# Patient Record
Sex: FEMALE | Race: OTHER | Employment: UNEMPLOYED | URBAN - METROPOLITAN AREA
[De-identification: names, ages, dates, MRNs, and addresses within clinical notes are randomized per-mention and may not be internally consistent; named-entity substitution may affect disease eponyms.]

---

## 2021-01-13 ENCOUNTER — HOSPITAL ENCOUNTER (OUTPATIENT)
Dept: PHYSICAL THERAPY | Age: 66
Discharge: HOME | End: 2021-01-13
Attending: STUDENT IN AN ORGANIZED HEALTH CARE EDUCATION/TRAINING PROGRAM
Payer: MEDICARE

## 2021-01-13 DIAGNOSIS — R49.0 MUSCLE TENSION DYSPHONIA: ICD-10-CM

## 2021-01-13 PROCEDURE — 92524 BEHAVRAL QUALIT ANALYS VOICE: CPT | Performed by: SPEECH-LANGUAGE PATHOLOGIST

## 2021-01-13 NOTE — THERAPY EVALUATION
Jocelin Bernal : 1955 Primary: [employer]* Secondary:  [facility] [place] [id number] [facility], [address], [facility] [address] [phone]   [phone] OUTPATIENT SPEECH LANGUAGE PATHOLOGY: Initial Assessment ICD-10: Treatment Diagnosis: Dysphonia R49.0 REFERRING PHYSICIAN: Zehra Mary MD MD Orders: Evaluate and Treat Return Physician Appointment: Unknown PAST MEDICAL HISTORY:  
Ms. Rolo Bernal is a 77 y.o. female who  has no past medical history on file. She also  has no past surgical history on file. 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 
Patient is a 77year old female referred for speech evaluation due to hoarseness. Patient reports this has been an ongoing issue for about 5-6 years. She was referred to ENT for further work up. Laryngoscopy revealed the following: False vocal cord use during phonation consistent with muscle tension dysphonia. There was some edema and erythema of the inter-arytenoid region c/w reflux, but no concerning mucosal lesions. The posterior pharyngeal was clear as well. The scope was then carefully removed. The patient tolerated the procedure well and there were no complications. Some question of spasmodic dysphonia as well. 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 Voice evaluation completed. Currently she drinks about 57 ounces of water daily and minimal caffeine. She does not smoke or drink alcohol. Her vocal activities consist of her singing to her grandson. She does not talk a lot on the phone but does yell/shout frequently. She endorses some coughing and throat clearing as well. (+) changes noted in her voice with changes in temperature only. She does have a history of GERD in which she currently takes Prilosec twice a day. Of note, she reports that prior to the reflux medication, she did not taste acid but now she does. She also endorses lower quadrant stomach pain. She also notices that she burps more as well. She is trying to avoid spicy foods and eat smaller meals as well. We discussed best practice and recommendations for GERD. She and spouse verbalized understanding. Oral motor exam completed. She has natural dentition with fair oral hygiene. She c/o sore throat. She has moderate pharyngeal wall redness in addition to ulcer along base of tongue. She may would benefit from magic mouthwash. Will discuss with referring MD. Laryngeal performance revealed the following: /s/ ration 10 seconds, /z/ ratio 2 seconds. Reduced laryngeal function as it relates to respiration observed. Maximum phonation 4 seconds with reduced and unstable pitch, tone and loudness. Maximum phonation should be 15 seconds for normed adults. Pitch breaks with reduced range and neck tension during pitch glides and scales. Clinical exam is consistent with muscle tensions dysphonia but spasmodic dysphonia should be considered as well. Treatment will focus on breath support, forward focus, decreasing laryngeal tension and strain, GERD and vocal hygiene strategies. Voice therapy recommended at 2x's a week for 8 weeks then I will re-assess. Education handouts given for GERD and vocal hygiene strategies. ?????? ? ? This section established at most recent assessment?????????? 
PROBLEM LIST (Impairments causing functional limitations): 1. Muscle tension dysphonia GOALS: (Goals have been discussed and agreed upon with patient.) SHORT-TERM FUNCTIONAL GOALS: Time Frame: 8 weeks 1. Demonstrate implementation of vocal hygiene techniques including improved hydration, reduced habitual coughing, throat clearing, and adherence to reflux precautions and medications regiment, reduction of vocally abusive behaviors with 90% adherence on a daily basis to reduce laryngeal inflammation. 2. Complete laryngeal massage, stretching, relaxation exercises on a daily basis with 90% accuracy to reduce hyper-function and laryngeal tension, reduce pain in laryngeal area and reduce irritation and globus in laryngeal area. 3. Use diaphragmatic breath support in structured tasks to sentence level with 80% accuracy to provide power to voice without vocal strain or laryngeal tension. 4. Use forward focus of voice energy with 80% accuracy in structured conversation to allow natural resonance of pitch without vocal strain or laryngeal tension. 5. Achieve maximum phonation time of 15 sec to improve breath support at phrase and sentence level in conversation. DISCHARGE GOALS: Time Frame: 2 months 1. Patient will improve vocal quality with use of forward focus strategies/techniques to improve vocal strength. REHABILITATION POTENTIAL FOR STATED GOALS: GoodPLAN OF CARE: 
Patient will benefit from skilled intervention to address the following impairments. INTERVENTIONS PLANNED: (Benefits and precautions of therapy have been discussed with the patient.) COMMUNICATION STRATEGIES:vocal rest  
VOCAL HYGIENE RECOMMENDATIONS: 
· Increase water intake · Limit caffeinated beverages · Voice rest 
· Decrease/Eliminate throat clearing · Follow reflux guidelines TREATMENT PLAN EFFECTIVE DATES: 1/13/2021 TO 3/14/2021 (60 days). FREQUENCY/DURATION: Continue to follow patient 2 times a week for 60 days to address above goals. Regarding Jocelin Machucaro's therapy, I certify that the treatment plan above will be carried out by a therapist or under their direction. Thank you for this referral, 
DARWIN Thompson Referring Physician Signature: Minh Mendez MD     Date SUBJECTIVE: 
Alert and pleasant Present Symptoms: Dysphonia Current Medications:  
Current Outpatient Medications on File Prior to Encounter Medication Sig Dispense Refill  omeprazole (PRILOSEC) 20 mg capsule Take 20 mg by mouth two (2) times a day. No current facility-administered medications on file prior to encounter. Date Last Reviewed: 1/13/2021 History of reflux:  YES    
? Reflux medication:Prilosec Social History/Home Situation:  Work/Activity History: Not employed OBJECTIVE: 
Objective Measure: Tool Used: National Outcomes Measurement System: Functional Communication Measures: VOICE Score:  Initial: 3 Most Recent: X (Date: -- ) Interpretation of Tool: This FCM should not be used for individuals who have had a laryngectomy or tracheotomy, or for individuals with resonance disorders. o Level 1:  The individual is unable to use voice to communicate. Alternative means for communicating are used all of the time. The individual cannot participate in vocational, avocational, and social activities requiring voice. o Level 2:  Voice is not functional for communication most of the time. Alternative means for communicating must be used most of the time. The individuals participation in vocational, avocational, and social activities is significantly limited all of the time. o Level 3:  Voice is functional for communication, but is consistently distracting and interferes with communication by drawing attention to itself. Participation in vocational, avocational, and social activities is limited most of the time. o Level 4:  Voice is functional for communication, but sometimes distracting. The individuals ability to participate in vocational, avocational, and social activities requiring voice is occasionally affected in low-vocal demand activities, but consistently affected in high-vocal demand activities. o Level 5:  Voice occasionally sounds normal with self-monitoring, but there is some situational variation. The individuals ability to participate in vocational, avocational, and social activities requiring voice is rarely affected in low-vocal demand activities, but is occasionally affected in high-vocal demand activities. o Level 6:  Voice sounds normal most of the time across all settings and situations. Self-monitoring is consistent when needed. The individuals ability to participate in vocational, avocational, and social activities requiring voice is not affected in low vocal demand activities, but is rarely affected in high-vocal demand activities. o Level 7: The individuals ability to successfully and independently participate in vocational, avocational, and social activities requiring high-or low-vocal demands is not limited by voice. Self-monitoring is effectively used, but only occasionally needed. Respiratory Status:  Room Air Oral Motor Structure/Speech:    
Voice: Voice Evaluation Type of Assessment: Perceptual eval/non-instrumental, Rating scales (specify) Vocal Onset: Breathy, S/Z ratio (comment) Vocal Quality: Back tone focus, Breathy, Spasmodic dysphonia, Hoarse, Phonation breaks Vocal Intensity: Too soft Vocal Pitch: Impaired flexibility, Pitch breaks, Restricted range Resonance: No impairment Breath Support: Clavicular breathing Vocal Cord Dysfunction: No impairment Vocal Cord Dysfunction: No 
Intonation: WNL Rate: WNL Prosody: WNL Overall Voice Impairment Severity: Moderate S/Z Ratio: /s/ 10 seconds   /z/ 2 seconds Maximum Phonation Time: 4 seconds Voice Handicap Index: not completed GRBAS:  
Grade (overall degree of hoarseness): 3 Rough (impression of the irregularity of the vocal fold vibrations): 1 Breathy (impression of air loss through the glottis):4 Aesthenic (weakness or lack of power in the voice): 1 Strained (perceptual impression of vocal hyperfunction): 2 
    TOTAL SCORE (Normal=0): 11 Vocally Abusive Behavior:  
· Excessive throat clearing · Decrease water intake · Frequent yelling or shouting Assessment only; No treatment(s) provided today__________________________________________________________________________________________________ Treatment Assessment:  Evaluation completed Progression/Medical Necessity:  
Patient is expected to demonstrate progress in vocal fold adduction, vocal quality and phonation/respiration coordination to improve functional communication, improve vocal endurance/decrease fatigue and decrease vocal tension/strain. Compliance with Program/Exercises: Will assess as treatment progresses Reason for Continuation of Services/Other Comments: 
· Patient continues to require skilled intervention due to muscle tension dysphonia . Recommendations/Intent for next treatment session: \"Treatment next visit will focus on goals\". Total Treatment Duration: 
Time In: 0930 Time Out: 1015 DARWIN Grijalva

## 2021-01-18 ENCOUNTER — HOSPITAL ENCOUNTER (OUTPATIENT)
Dept: PHYSICAL THERAPY | Age: 66
Discharge: HOME | End: 2021-01-18
Attending: STUDENT IN AN ORGANIZED HEALTH CARE EDUCATION/TRAINING PROGRAM
Payer: MEDICARE

## 2021-01-18 PROCEDURE — 92507 TX SP LANG VOICE COMM INDIV: CPT | Performed by: SPEECH-LANGUAGE PATHOLOGIST

## 2021-01-18 NOTE — PROGRESS NOTES
Jocelin Cullen  : 1955  Primary: [employer] [facility]*  Secondary:  [address] at [place] [id number] [facility], [address] [place] [address],[address], [address]  [phone]   [phone]         OUTPATIENT SPEECH LANGUAGE PATHOLOGY: Daily Note 1    ICD-10: Treatment Diagnosis: Dysphonia R49.0  REFERRING PHYSICIAN: Taylor Dick MD MD Orders: Evaluate and Treat  Return Physician Appointment: Unknown  PAST MEDICAL HISTORY:   Ms. Andre Cullen is a 77 y.o. female who  has no past medical history on file. She also  has no past surgical history on file. MEDICAL/REFERRING DIAGNOSIS: Dysphonia [R49.0]  DATE OF ONSET: 5-6 years ago  PRIOR LEVEL UOF FNCTION: Independent with ADL's  PRECAUTIONS/ALLERGIES: NKDA  ASSESSMENT:  Patient is a 77year old female referred for speech evaluation due to hoarseness. Patient reports this has been an ongoing issue for about 5-6 years. She was referred to ENT for further work up. Laryngoscopy revealed the following: False vocal cord use during phonation consistent with muscle tension dysphonia. There was some edema and erythema of the inter-arytenoid region c/w reflux, but no concerning mucosal lesions. The posterior pharyngeal was clear as well. The scope was then carefully removed. The patient tolerated the procedure well and there were no complications. Some question of spasmodic dysphonia as well. 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 Patient is present this date for this date. She endorses increased GERD along with stomach pain. She reports that she has stopped the Prilosec for about 2 days due to increased reflux sx's. She reports that she eats her last meal around 6pm and stays up and moving until about 8pm and once she sits down the reflux begins.  She reports coughing up frothy phlegm as well. We continue to discuss GERD management and vocal hygiene. Completed vocal tasks focusing on breathing utilizing breather. Inhale and Exhale set at 1. Increased difficulty with breathing using diaphragm. Noticeable tension along neck and shoulder region. Required mod A for proper use of technique. Completed forward focus with mod A due to technique. See below for specific tasks. ?????? ? ? This section established at most recent assessment??????????  PROBLEM LIST (Impairments causing functional limitations):  1. Muscle tension dysphonia   GOALS: (Goals have been discussed and agreed upon with patient.)  SHORT-TERM FUNCTIONAL GOALS: Time Frame: 8 weeks  1. Demonstrate implementation of vocal hygiene techniques including improved hydration, reduced habitual coughing, throat clearing, and adherence to reflux precautions and medications regiment, reduction of vocally abusive behaviors with 90% adherence on a daily basis to reduce laryngeal inflammation. 2. Complete laryngeal massage, stretching, relaxation exercises on a daily basis with 90% accuracy to reduce hyper-function and laryngeal tension, reduce pain in laryngeal area and reduce irritation and globus in laryngeal area. 3. Use diaphragmatic breath support in structured tasks to sentence level with 80% accuracy to provide power to voice without vocal strain or laryngeal tension. 4. Use forward focus of voice energy with 80% accuracy in structured conversation to allow natural resonance of pitch without vocal strain or laryngeal tension. 5. Achieve maximum phonation time of 15 sec to improve breath support at phrase and sentence level in conversation. DISCHARGE GOALS: Time Frame: 2 months   1. Patient will improve vocal quality with use of forward focus strategies/techniques to improve vocal strength.    REHABILITATION POTENTIAL FOR STATED GOALS: Wiley Jovel OF CARE:  Patient will benefit from skilled intervention to address the following impairments. INTERVENTIONS PLANNED: (Benefits and precautions of therapy have been discussed with the patient.)  COMMUNICATION STRATEGIES:vocal rest   VOCAL HYGIENE RECOMMENDATIONS:  · Increase water intake  · Limit caffeinated beverages  · Voice rest  · Decrease/Eliminate throat clearing  · Follow reflux guidelines  TREATMENT PLAN EFFECTIVE DATES: 1/13/2021 TO 3/14/2021 (60 days). FREQUENCY/DURATION: Continue to follow patient 2 times a week for 60 days to address above goals. Regarding Jocelin Jauregui's therapy, I certify that the treatment plan above will be carried out by a therapist or under their direction. Thank you for this referral,  DARWIN Whitaker Ed CCC-SLP                  Referring Physician Signature: Chey Rod MD     Date      SUBJECTIVE:  Alert and pleasant  Present Symptoms: Dysphonia      Current Medications:   Current Outpatient Medications on File Prior to Encounter   Medication Sig Dispense Refill    omeprazole (PRILOSEC) 20 mg capsule Take 20 mg by mouth two (2) times a day. No current facility-administered medications on file prior to encounter. Date Last Reviewed: 1/18/2021  History of reflux:  YES      Reflux medication:Prilosec  Social History/Home Situation:        Work/Activity History: Not employed     OBJECTIVE:  Objective Measure: Tool Used: National Outcomes Measurement System: Functional Communication Measures: VOICE  Score:  Initial: 3 Most Recent: X (Date: -- )   Interpretation of Tool: This FCM should not be used for individuals who have had a laryngectomy or tracheotomy, or for individuals with resonance disorders. o Level 1:  The individual is unable to use voice to communicate. Alternative means for communicating are used all of the time. The individual cannot participate in vocational, avocational, and social activities requiring voice. o Level 2:  Voice is not functional for communication most of the time.  Alternative means for communicating must be used most of the time. The individuals participation in vocational, avocational, and social activities is significantly limited all of the time. o Level 3:  Voice is functional for communication, but is consistently distracting and interferes with communication by drawing attention to itself. Participation in vocational, avocational, and social activities is limited most of the time. o Level 4:  Voice is functional for communication, but sometimes distracting. The individuals ability to participate in vocational, avocational, and social activities requiring voice is occasionally affected in low-vocal demand activities, but consistently affected in high-vocal demand activities. o Level 5:  Voice occasionally sounds normal with self-monitoring, but there is some situational variation. The individuals ability to participate in vocational, avocational, and social activities requiring voice is rarely affected in low-vocal demand activities, but is occasionally affected in high-vocal demand activities. o Level 6:  Voice sounds normal most of the time across all settings and situations. Self-monitoring is consistent when needed. The individuals ability to participate in vocational, avocational, and social activities requiring voice is not affected in low vocal demand activities, but is rarely affected in high-vocal demand activities. o Level 7: The individuals ability to successfully and independently participate in vocational, avocational, and social activities requiring high-or low-vocal demands is not limited by voice. Self-monitoring is effectively used, but only occasionally needed.     Respiratory Status:  Room Air  Oral Motor Structure/Speech:     Voice:    S/Z Ratio: /s/ 10 seconds   /z/ 2 seconds  Maximum Phonation Time: 4 seconds   Voice Handicap Index: not completed    GRBAS:   Grade (overall degree of hoarseness): 3  Rough (impression of the irregularity of the vocal fold vibrations): 1  Breathy (impression of air loss through the glottis):4  Aesthenic (weakness or lack of power in the voice): 1  Strained (perceptual impression of vocal hyperfunction): 2      TOTAL SCORE (Normal=0): 11  Vocally Abusive Behavior:   · Excessive throat clearing  · Decrease water intake  · Frequent yelling or shouting  Voice Activities: Activities/Procedures listed utilized to improve progress in vocal quality and phonation/respiration coordination. Required moderate cueing to improve functional communication, improve vocal endurance/decrease fatigue and decrease vocal tension/strain. 1.  Breather   Inhale 1: 5 repetitions  Exhale 1: 5 repetitions     2. Vocal warm up  -lip trills voiced and unvoiced   __________________________________________________________________________________________________  Treatment Assessment:  Evaluation completed  Progression/Medical Necessity:   Patient is expected to demonstrate progress in vocal fold adduction, vocal quality and phonation/respiration coordination to improve functional communication, improve vocal endurance/decrease fatigue and decrease vocal tension/strain. Compliance with Program/Exercises: Will assess as treatment progresses   Reason for Continuation of Services/Other Comments:  · Patient continues to require skilled intervention due to muscle tension dysphonia . Recommendations/Intent for next treatment session: \"Treatment next visit will focus on goals\". Total Treatment Duration:  Time In: 0845  Time Out: KAMILA [address], Aki Garrison. Teresa Sanchez

## 2021-01-20 ENCOUNTER — APPOINTMENT (OUTPATIENT)
Dept: PHYSICAL THERAPY | Age: 66
End: 2021-01-20
Attending: STUDENT IN AN ORGANIZED HEALTH CARE EDUCATION/TRAINING PROGRAM
Payer: MEDICARE

## 2021-01-27 ENCOUNTER — HOSPITAL ENCOUNTER (OUTPATIENT)
Dept: PHYSICAL THERAPY | Age: 66
Discharge: HOME | End: 2021-01-27
Attending: STUDENT IN AN ORGANIZED HEALTH CARE EDUCATION/TRAINING PROGRAM
Payer: MEDICARE

## 2021-01-27 DIAGNOSIS — R49.0 MUSCLE TENSION DYSPHONIA: ICD-10-CM

## 2021-01-27 PROCEDURE — 92507 TX SP LANG VOICE COMM INDIV: CPT | Performed by: SPEECH-LANGUAGE PATHOLOGIST

## 2021-01-27 NOTE — PROGRESS NOTES
Jocelin Kendall  : 1955  Primary: Ko Delay*  Secondary:  [address] at [address] [id number] [facility], [address] [place] [address] [address], [address] [facility]  [phone]   [phone]         OUTPATIENT SPEECH LANGUAGE PATHOLOGY: Daily Note 1    ICD-10: Treatment Diagnosis: Dysphonia R49.0  REFERRING PHYSICIAN: Stephy Soares MD MD Orders: Evaluate and Treat  Return Physician Appointment: Unknown  PAST MEDICAL HISTORY:   Ms. Mauricio Kendall is a 77 y.o. female who  has no past medical history on file. She also  has no past surgical history on file. 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 Patient reports this has been an ongoing issue for about 5-6 years. She was referred to ENT for further work up. Laryngoscopy revealed the following: False vocal cord use during phonation consistent with muscle tension dysphonia. There was some edema and erythema of the inter-arytenoid region c/w reflux, but no concerning mucosal lesions. The posterior pharyngeal was clear as well. The scope was then carefully removed. The patient tolerated the procedure well and there were no complications. Some question of spasmodic dysphonia as well. 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 Patient is present this date for this date. She endorses increased GERD along with stomach pain. She reports that she has stopped the Prilosec for about 2 days due to increased reflux sx's. She reports that she eats her last meal around 6pm and stays up and moving until about 8pm and once she sits down the reflux begins. She reports coughing up frothy phlegm as well. We continue to discuss GERD management and vocal hygiene. Completed vocal tasks focusing on breathing utilizing breather. Inhale and Exhale set at 1. Increased difficulty with breathing using diaphragm. Noticeable tension along neck and shoulder region. Required mod A for proper use of technique. Completed forward focus with mod A due to technique. See below for specific tasks. ?????? ? ? This section established at most recent assessment??????????  PROBLEM LIST (Impairments causing functional limitations):  1. Muscle tension dysphonia   GOALS: (Goals have been discussed and agreed upon with patient.)  SHORT-TERM FUNCTIONAL GOALS: Time Frame: 8 weeks  1. Demonstrate implementation of vocal hygiene techniques including improved hydration, reduced habitual coughing, throat clearing, and adherence to reflux precautions and medications regiment, reduction of vocally abusive behaviors with 90% adherence on a daily basis to reduce laryngeal inflammation. 2. Complete laryngeal massage, stretching, relaxation exercises on a daily basis with 90% accuracy to reduce hyper-function and laryngeal tension, reduce pain in laryngeal area and reduce irritation and globus in laryngeal area. 3. Use diaphragmatic breath support in structured tasks to sentence level with 80% accuracy to provide power to voice without vocal strain or laryngeal tension. 4. Use forward focus of voice energy with 80% accuracy in structured conversation to allow natural resonance of pitch without vocal strain or laryngeal tension. 5. Achieve maximum phonation time of 15 sec to improve breath support at phrase and sentence level in conversation. DISCHARGE GOALS: Time Frame: 2 months   1. Patient will improve vocal quality with use of forward focus strategies/techniques to improve vocal strength.    REHABILITATION POTENTIAL FOR STATED GOALS: Loletha Phalen OF CARE:  Patient will benefit from skilled intervention to address the following impairments. INTERVENTIONS PLANNED: (Benefits and precautions of therapy have been discussed with the patient.)  COMMUNICATION STRATEGIES:vocal rest   VOCAL HYGIENE RECOMMENDATIONS:  · Increase water intake  · Limit caffeinated beverages  · Voice rest  · Decrease/Eliminate throat clearing  · Follow reflux guidelines  TREATMENT PLAN EFFECTIVE DATES: 1/13/2021 TO 3/14/2021 (60 days). FREQUENCY/DURATION: Continue to follow patient 2 times a week for 60 days to address above goals. Regarding Jocelin Jauregui's therapy, I certify that the treatment plan above will be carried out by a therapist or under their direction. Thank you for this referral,  DARWIN Phelps Ed CCC-SLP                  Referring Physician Signature: Laura Seaman MD     Date      SUBJECTIVE:  Alert and pleasant  Present Symptoms: Dysphonia      Current Medications:   Current Outpatient Medications on File Prior to Encounter   Medication Sig Dispense Refill    omeprazole (PRILOSEC) 20 mg capsule Take 20 mg by mouth two (2) times a day. No current facility-administered medications on file prior to encounter. Date Last Reviewed: 1/18/2021  History of reflux:  YES      Reflux medication:Prilosec  Social History/Home Situation:        Work/Activity History: Not employed     OBJECTIVE:  Objective Measure: Tool Used: National Outcomes Measurement System: Functional Communication Measures: VOICE  Score:  Initial: 3 Most Recent: X (Date: -- )   Interpretation of Tool: This FCM should not be used for individuals who have had a laryngectomy or tracheotomy, or for individuals with resonance disorders. o Level 1:  The individual is unable to use voice to communicate. Alternative means for communicating are used all of the time. The individual cannot participate in vocational, avocational, and social activities requiring voice. o Level 2:  Voice is not functional for communication most of the time. Alternative means for communicating must be used most of the time. The individuals participation in vocational, avocational, and social activities is significantly limited all of the time. o Level 3:  Voice is functional for communication, but is consistently distracting and interferes with communication by drawing attention to itself. Participation in vocational, avocational, and social activities is limited most of the time. o Level 4:  Voice is functional for communication, but sometimes distracting. The individuals ability to participate in vocational, avocational, and social activities requiring voice is occasionally affected in low-vocal demand activities, but consistently affected in high-vocal demand activities. o Level 5:  Voice occasionally sounds normal with self-monitoring, but there is some situational variation. The individuals ability to participate in vocational, avocational, and social activities requiring voice is rarely affected in low-vocal demand activities, but is occasionally affected in high-vocal demand activities. o Level 6:  Voice sounds normal most of the time across all settings and situations. Self-monitoring is consistent when needed. The individuals ability to participate in vocational, avocational, and social activities requiring voice is not affected in low vocal demand activities, but is rarely affected in high-vocal demand activities. o Level 7: The individuals ability to successfully and independently participate in vocational, avocational, and social activities requiring high-or low-vocal demands is not limited by voice. Self-monitoring is effectively used, but only occasionally needed.     Respiratory Status:  Room Air  Oral Motor Structure/Speech:     Voice:    S/Z Ratio: /s/ 10 seconds   /z/ 2 seconds  Maximum Phonation Time: 4 seconds   Voice Handicap Index: not completed    GRBAS:   Grade (overall degree of hoarseness): 3  Rough (impression of the irregularity of the vocal fold vibrations): 1  Breathy (impression of air loss through the glottis):4  Aesthenic (weakness or lack of power in the voice): 1  Strained (perceptual impression of vocal hyperfunction): 2      TOTAL SCORE (Normal=0): 11  Vocally Abusive Behavior:   · Excessive throat clearing  · Decrease water intake  · Frequent yelling or shouting  Voice Activities: Activities/Procedures listed utilized to improve progress in vocal quality and phonation/respiration coordination. Required moderate cueing to improve functional communication, improve vocal endurance/decrease fatigue and decrease vocal tension/strain. 1.  Breather   Inhale 1: 5 repetitions  Exhale 1: 5 repetitions     2. Breather   Inhale 2: 5 repetitions  Exhale: 2 5 repetitions    3. Vocal warm up  -lip trills voiced and unvoiced     4. Forward focus humm with mod A due to increased back tone focus, required mod to max verbal and visual cues  __________________________________________________________________________________________________  Treatment Assessment:  Evaluation completed  Progression/Medical Necessity:   Patient is expected to demonstrate progress in vocal fold adduction, vocal quality and phonation/respiration coordination to improve functional communication, improve vocal endurance/decrease fatigue and decrease vocal tension/strain. Compliance with Program/Exercises: Will assess as treatment progresses   Reason for Continuation of Services/Other Comments:  · Patient continues to require skilled intervention due to muscle tension dysphonia . Recommendations/Intent for next treatment session: \"Treatment next visit will focus on goals\". Total Treatment Duration:  Time In: 0930  Time Out: [address], [place]. Lionel Love

## 2021-02-01 ENCOUNTER — APPOINTMENT (OUTPATIENT)
Dept: PHYSICAL THERAPY | Age: 66
End: 2021-02-01
Attending: STUDENT IN AN ORGANIZED HEALTH CARE EDUCATION/TRAINING PROGRAM

## 2021-02-08 ENCOUNTER — APPOINTMENT (OUTPATIENT)
Dept: PHYSICAL THERAPY | Age: 66
End: 2021-02-08
Attending: STUDENT IN AN ORGANIZED HEALTH CARE EDUCATION/TRAINING PROGRAM

## 2021-02-12 ENCOUNTER — APPOINTMENT (OUTPATIENT)
Dept: PHYSICAL THERAPY | Age: 66
End: 2021-02-12
Attending: STUDENT IN AN ORGANIZED HEALTH CARE EDUCATION/TRAINING PROGRAM

## 2021-02-15 ENCOUNTER — APPOINTMENT (OUTPATIENT)
Dept: PHYSICAL THERAPY | Age: 66
End: 2021-02-15
Attending: STUDENT IN AN ORGANIZED HEALTH CARE EDUCATION/TRAINING PROGRAM

## 2021-02-19 ENCOUNTER — APPOINTMENT (OUTPATIENT)
Dept: PHYSICAL THERAPY | Age: 66
End: 2021-02-19
Attending: STUDENT IN AN ORGANIZED HEALTH CARE EDUCATION/TRAINING PROGRAM

## 2021-02-26 ENCOUNTER — APPOINTMENT (OUTPATIENT)
Dept: PHYSICAL THERAPY | Age: 66
End: 2021-02-26
Attending: STUDENT IN AN ORGANIZED HEALTH CARE EDUCATION/TRAINING PROGRAM

## 2021-03-01 ENCOUNTER — APPOINTMENT (OUTPATIENT)
Dept: PHYSICAL THERAPY | Age: 66
End: 2021-03-01
Attending: STUDENT IN AN ORGANIZED HEALTH CARE EDUCATION/TRAINING PROGRAM

## 2021-03-05 ENCOUNTER — APPOINTMENT (OUTPATIENT)
Dept: PHYSICAL THERAPY | Age: 66
End: 2021-03-05
Attending: STUDENT IN AN ORGANIZED HEALTH CARE EDUCATION/TRAINING PROGRAM

## 2021-08-17 NOTE — PROGRESS NOTES
Jocelin Nunez  : 1955  Primary: Pablito Bones*  Secondary:  [address] at [address], [address] [place] [address], [facility] [address]  [phone]   [phone]         OUTPATIENT SPEECH LANGUAGE PATHOLOGY: Daily Note 1    ICD-10: Treatment Diagnosis: Dysphonia R49.0  REFERRING PHYSICIAN: Libby Alexander MD MD Orders: Evaluate and Treat  Return Physician Appointment: Unknown  PAST MEDICAL HISTORY:   Ms. Grey Nunez is a 77 y.o. female who  has no past medical history on file. She also  has no past surgical history on file. 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 Patient reports this has been an ongoing issue for about 5-6 years. She was referred to ENT for further work up. Laryngoscopy revealed the following: False vocal cord use during phonation consistent with muscle tension dysphonia. There was some edema and erythema of the inter-arytenoid region c/w reflux, but no concerning mucosal lesions. The posterior pharyngeal was clear as well. The scope was then carefully removed. The patient tolerated the procedure well and there were no complications. Some question of spasmodic dysphonia as well. 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 Patient was seen for two visits due to hoarseness. Patient did not schedule subsequent appointments so this would serve as his discontinuation summary. ?????? ? ? This section established at most recent assessment??????????  PROBLEM LIST (Impairments causing functional limitations):  1.  Muscle tension dysphonia   GOALS: (Goals have been discussed and agreed upon with patient.)  SHORT-TERM FUNCTIONAL GOALS: Time Frame: 8 weeks  1. Demonstrate implementation of vocal hygiene techniques including improved hydration, reduced habitual coughing, throat clearing, and adherence to reflux precautions and medications regiment, reduction of vocally abusive behaviors with 90% adherence on a daily basis to reduce laryngeal inflammation. Met   2. Complete laryngeal massage, stretching, relaxation exercises on a daily basis with 90% accuracy to reduce hyper-function and laryngeal tension, reduce pain in laryngeal area and reduce irritation and globus in laryngeal area. Met   3. Use diaphragmatic breath support in structured tasks to sentence level with 80% accuracy to provide power to voice without vocal strain or laryngeal tension. Met   4. Use forward focus of voice energy with 80% accuracy in structured conversation to allow natural resonance of pitch without vocal strain or laryngeal tension. Met   5. Achieve maximum phonation time of 15 sec to improve breath support at phrase and sentence level in conversation. Met   DISCHARGE GOALS: Time Frame: 2 months   1. Patient will improve vocal quality with use of forward focus strategies/techniques to improve vocal strength. Met   REHABILITATION POTENTIAL FOR STATED GOALS: GoodSUBJECTIVE:  Alert and pleasant  Present Symptoms: Dysphonia      Current Medications:   Current Outpatient Medications on File Prior to Encounter   Medication Sig Dispense Refill   • omeprazole (PRILOSEC) 20 mg capsule Take 20 mg by mouth two (2) times a day.       No current facility-administered medications on file prior to encounter.        Date Last Reviewed: 1/18/2021  History of reflux:  YES     • Reflux medication:Prilosec  Social History/Home Situation:        Work/Activity History: Not employed     OBJECTIVE:  Objective Measure:  Tool Used: National Outcomes Measurement System: Functional  Communication Measures: VOICE  Score:  Initial: 3 Most Recent: X (Date: -- )   Interpretation of Tool: This FCM should not be used for individuals who have had a laryngectomy or tracheotomy, or for individuals with resonance disorders. o Level 1:  The individual is unable to use voice to communicate. Alternative means for communicating are used all of the time. The individual cannot participate in vocational, avocational, and social activities requiring voice. o Level 2:  Voice is not functional for communication most of the time. Alternative means for communicating must be used most of the time. The individuals participation in vocational, avocational, and social activities is significantly limited all of the time. o Level 3:  Voice is functional for communication, but is consistently distracting and interferes with communication by drawing attention to itself. Participation in vocational, avocational, and social activities is limited most of the time. o Level 4:  Voice is functional for communication, but sometimes distracting. The individuals ability to participate in vocational, avocational, and social activities requiring voice is occasionally affected in low-vocal demand activities, but consistently affected in high-vocal demand activities. o Level 5:  Voice occasionally sounds normal with self-monitoring, but there is some situational variation. The individuals ability to participate in vocational, avocational, and social activities requiring voice is rarely affected in low-vocal demand activities, but is occasionally affected in high-vocal demand activities. o Level 6:  Voice sounds normal most of the time across all settings and situations. Self-monitoring is consistent when needed. The individuals ability to participate in vocational, avocational, and social activities requiring voice is not affected in low vocal demand activities, but is rarely affected in high-vocal demand activities.   o Level 7:  The individuals ability to successfully and independently participate in vocational, avocational, and social activities requiring high-or low-vocal demands is not limited by voice. Self-monitoring is effectively used, but only occasionally needed. DARWIN Grijalva

## 2022-07-18 ENCOUNTER — TELEPHONE (OUTPATIENT)
Dept: ORTHOPEDIC SURGERY | Age: 67
End: 2022-07-18